# Patient Record
Sex: MALE | URBAN - METROPOLITAN AREA
[De-identification: names, ages, dates, MRNs, and addresses within clinical notes are randomized per-mention and may not be internally consistent; named-entity substitution may affect disease eponyms.]

---

## 2022-10-28 ENCOUNTER — APPOINTMENT (RX ONLY)
Dept: URBAN - METROPOLITAN AREA CLINIC 142 | Facility: CLINIC | Age: 67
Setting detail: DERMATOLOGY
End: 2022-10-28

## 2022-10-28 DIAGNOSIS — Z85.828 PERSONAL HISTORY OF OTHER MALIGNANT NEOPLASM OF SKIN: ICD-10-CM

## 2022-10-28 DIAGNOSIS — M71 OTHER BURSOPATHIES: ICD-10-CM

## 2022-10-28 DIAGNOSIS — B35.1 TINEA UNGUIUM: ICD-10-CM

## 2022-10-28 DIAGNOSIS — L40.0 PSORIASIS VULGARIS: ICD-10-CM

## 2022-10-28 PROBLEM — M71.342 OTHER BURSAL CYST, LEFT HAND: Status: ACTIVE | Noted: 2022-10-28

## 2022-10-28 PROBLEM — L60.9 NAIL DISORDER, UNSPECIFIED: Status: ACTIVE | Noted: 2022-10-28

## 2022-10-28 PROCEDURE — ? NAIL CLIPPING FOR PAS

## 2022-10-28 PROCEDURE — 99213 OFFICE O/P EST LOW 20 MIN: CPT

## 2022-10-28 PROCEDURE — ? COUNSELING

## 2022-10-28 PROCEDURE — ? PRESCRIPTION MEDICATION MANAGEMENT

## 2022-10-28 ASSESSMENT — LOCATION SIMPLE DESCRIPTION DERM
LOCATION SIMPLE: LEFT RING FINGER
LOCATION SIMPLE: RIGHT MIDDLE FINGER
LOCATION SIMPLE: RIGHT RING FINGER
LOCATION SIMPLE: LEFT INDEX FINGER
LOCATION SIMPLE: LEFT MIDDLE FINGER
LOCATION SIMPLE: RIGHT MIDDLE FINGERNAIL
LOCATION SIMPLE: LEFT KNEE
LOCATION SIMPLE: RIGHT KNEE
LOCATION SIMPLE: RIGHT INDEX FINGER

## 2022-10-28 ASSESSMENT — LOCATION DETAILED DESCRIPTION DERM
LOCATION DETAILED: LEFT INDEX FINGERNAIL
LOCATION DETAILED: RIGHT RING FINGERNAIL
LOCATION DETAILED: RIGHT MIDDLE FINGERNAIL
LOCATION DETAILED: RIGHT MIDDLE FINGER LUNULA
LOCATION DETAILED: LEFT MIDDLE FINGERNAIL
LOCATION DETAILED: RIGHT INDEX FINGERNAIL
LOCATION DETAILED: LEFT DISTAL RADIAL PALMAR INDEX FINGER
LOCATION DETAILED: RIGHT KNEE
LOCATION DETAILED: LEFT KNEE
LOCATION DETAILED: LEFT RING FINGERNAIL

## 2022-10-28 ASSESSMENT — LOCATION ZONE DERM
LOCATION ZONE: FINGER
LOCATION ZONE: LEG
LOCATION ZONE: FINGERNAIL

## 2022-10-28 NOTE — PROCEDURE: MIPS QUALITY
Quality 47: Advance Care Plan: Advance Care Planning discussed and documented in the medical record; patient did not wish or was not able to name a surrogate decision maker or provide an advance care plan.
Detail Level: Zone
Quality 130: Documentation Of Current Medications In The Medical Record: Current Medications Documented

## 2022-10-28 NOTE — PROCEDURE: NAIL CLIPPING FOR PAS
Detail Level: Detailed
Billing Type: [employer]
Add [id number] To Bill?: No
Lab: [id number]
Lab Facility: [id number]

## 2023-06-30 ENCOUNTER — APPOINTMENT (RX ONLY)
Dept: URBAN - METROPOLITAN AREA CLINIC 142 | Facility: CLINIC | Age: 68
Setting detail: DERMATOLOGY
End: 2023-06-30

## 2023-06-30 DIAGNOSIS — L91.8 OTHER HYPERTROPHIC DISORDERS OF THE SKIN: ICD-10-CM

## 2023-06-30 DIAGNOSIS — B35.1 TINEA UNGUIUM: ICD-10-CM

## 2023-06-30 PROCEDURE — ? PRESCRIPTION

## 2023-06-30 PROCEDURE — 99213 OFFICE O/P EST LOW 20 MIN: CPT

## 2023-06-30 PROCEDURE — ? ORDER TESTS

## 2023-06-30 PROCEDURE — ? COUNSELING

## 2023-06-30 PROCEDURE — ? ADDITIONAL NOTES

## 2023-06-30 RX ORDER — CICLOPIROX 80 MG/ML
SOLUTION TOPICAL
Qty: 6.6 | Refills: 1 | Status: ERX | COMMUNITY
Start: 2023-06-30

## 2023-06-30 RX ADMIN — CICLOPIROX: 80 SOLUTION TOPICAL at 00:00

## 2023-06-30 ASSESSMENT — LOCATION ZONE DERM: LOCATION ZONE: EYELID

## 2023-06-30 ASSESSMENT — LOCATION SIMPLE DESCRIPTION DERM: LOCATION SIMPLE: LEFT SUPERIOR EYELID

## 2023-06-30 ASSESSMENT — LOCATION DETAILED DESCRIPTION DERM: LOCATION DETAILED: LEFT LATERAL SUPERIOR EYELID

## 2023-06-30 NOTE — PROCEDURE: ADDITIONAL NOTES
Detail Level: Simple
Render Risk Assessment In Note?: no
Additional Notes: Referred to ophthalmologist Dr. Brennen Jamison, info given to pt
Additional Notes: Hep panel lab ordered, pending results for treatment

## 2023-06-30 NOTE — PROCEDURE: ORDER TESTS
Billing Type: [employer]
Expected Date Of Service: 06/30/2023
Lab Facility: [id number]
Performing Laboratory: [id number]
Bill For Surgical Tray: no

## 2023-09-01 ENCOUNTER — APPOINTMENT (RX ONLY)
Dept: URBAN - METROPOLITAN AREA CLINIC 142 | Facility: CLINIC | Age: 68
Setting detail: DERMATOLOGY
End: 2023-09-01

## 2023-09-01 DIAGNOSIS — B35.1 TINEA UNGUIUM: ICD-10-CM

## 2023-09-01 PROCEDURE — ? COUNSELING

## 2023-09-01 PROCEDURE — ? ADDITIONAL NOTES

## 2023-09-01 PROCEDURE — 99213 OFFICE O/P EST LOW 20 MIN: CPT

## 2023-09-01 ASSESSMENT — LOCATION SIMPLE DESCRIPTION DERM
LOCATION SIMPLE: RIGHT MIDDLE FINGERNAIL
LOCATION SIMPLE: LEFT INDEX FINGERNAIL
LOCATION SIMPLE: RIGHT INDEX FINGER

## 2023-09-01 ASSESSMENT — LOCATION DETAILED DESCRIPTION DERM
LOCATION DETAILED: LEFT INDEX FINGER LUNULA
LOCATION DETAILED: RIGHT MIDDLE FINGER LUNULA
LOCATION DETAILED: RIGHT INDEX FINGERNAIL

## 2023-09-01 ASSESSMENT — LOCATION ZONE DERM: LOCATION ZONE: FINGERNAIL

## 2023-09-01 NOTE — PROCEDURE: ADDITIONAL NOTES
Render Risk Assessment In Note?: no
Detail Level: Simple
Additional Notes: Awaiting bloodwork results Hepatic Panel—> once results received the oral medication to be sent in \\[facility] fluconazole 200mg weekly until clear.

## 2023-09-14 ENCOUNTER — RX ONLY (OUTPATIENT)
Age: 68
Setting detail: RX ONLY
End: 2023-09-14

## 2023-09-14 RX ORDER — FLUCONAZOLE 200 MG/1
TABLET ORAL
Qty: 8 | Refills: 0 | Status: ERX | COMMUNITY
Start: 2023-09-14

## 2023-11-17 ENCOUNTER — APPOINTMENT (RX ONLY)
Dept: URBAN - METROPOLITAN AREA CLINIC 142 | Facility: CLINIC | Age: 68
Setting detail: DERMATOLOGY
End: 2023-11-17

## 2023-11-17 ENCOUNTER — RX ONLY (OUTPATIENT)
Age: 68
Setting detail: RX ONLY
End: 2023-11-17

## 2023-11-17 DIAGNOSIS — B35.1 TINEA UNGUIUM: ICD-10-CM

## 2023-11-17 PROCEDURE — ? PRESCRIPTION

## 2023-11-17 PROCEDURE — ? PRESCRIPTION MEDICATION MANAGEMENT

## 2023-11-17 PROCEDURE — 99213 OFFICE O/P EST LOW 20 MIN: CPT

## 2023-11-17 PROCEDURE — ? COUNSELING

## 2023-11-17 PROCEDURE — ? ORDER TESTS

## 2023-11-17 RX ORDER — CICLOPIROX 80 MG/ML
SOLUTION TOPICAL
Qty: 6.6 | Refills: 6 | Status: ERX

## 2023-11-17 RX ORDER — FLUCONAZOLE 200 MG/1
TABLET ORAL
Qty: 8 | Refills: 0 | Status: ERX | COMMUNITY
Start: 2023-11-17

## 2023-11-17 RX ORDER — FLUCONAZOLE 200 MG/1
TABLET ORAL
Qty: 8 | Refills: 6 | Status: CANCELLED
Stop reason: SDUPTHER

## 2023-11-17 RX ADMIN — FLUCONAZOLE: 200 TABLET ORAL at 00:00

## 2023-11-17 ASSESSMENT — LOCATION SIMPLE DESCRIPTION DERM
LOCATION SIMPLE: LEFT INDEX FINGER
LOCATION SIMPLE: LEFT THUMB

## 2023-11-17 ASSESSMENT — LOCATION DETAILED DESCRIPTION DERM
LOCATION DETAILED: LEFT DISTAL ULNAR THUMB
LOCATION DETAILED: LEFT INDEX DISTAL INTERPHALANGEAL JOINT

## 2023-11-17 ASSESSMENT — LOCATION ZONE DERM: LOCATION ZONE: FINGER

## 2023-11-17 NOTE — PROCEDURE: ORDER TESTS
Billing Type: [employer]
Performing Laboratory: [id number]
Bill For Surgical Tray: no
Lab Facility: [id number]
Expected Date Of Service: 11/17/2023

## 2023-11-17 NOTE — PROCEDURE: PRESCRIPTION MEDICATION MANAGEMENT
Continue Regimen: Ciclopirox solution 0.8% qhs
Render In Strict Bullet Format?: No
Plan: Took Fluconazole 200 mg once a week x8 weeks
Detail Level: Zone

## 2024-01-19 ENCOUNTER — APPOINTMENT (RX ONLY)
Dept: URBAN - METROPOLITAN AREA CLINIC 142 | Facility: CLINIC | Age: 69
Setting detail: DERMATOLOGY
End: 2024-01-19

## 2024-01-19 ENCOUNTER — RX ONLY (OUTPATIENT)
Age: 69
Setting detail: RX ONLY
End: 2024-01-19

## 2024-01-19 DIAGNOSIS — B35.1 TINEA UNGUIUM: ICD-10-CM

## 2024-01-19 DIAGNOSIS — L40.0 PSORIASIS VULGARIS: ICD-10-CM | Status: STABLE

## 2024-01-19 PROCEDURE — ? PRESCRIPTION

## 2024-01-19 PROCEDURE — ? COUNSELING

## 2024-01-19 PROCEDURE — ? PRESCRIPTION MEDICATION MANAGEMENT

## 2024-01-19 PROCEDURE — 99214 OFFICE O/P EST MOD 30 MIN: CPT

## 2024-01-19 RX ORDER — CLOBETASOL PROPIONATE 0.5 MG/ML
SOLUTION TOPICAL
Qty: 50 | Refills: 3 | Status: ERX

## 2024-01-19 RX ORDER — FLUCONAZOLE 200 MG/1
TABLET ORAL
Qty: 16 | Refills: 0 | Status: ERX

## 2024-01-19 ASSESSMENT — LOCATION DETAILED DESCRIPTION DERM
LOCATION DETAILED: LEFT SUPERIOR PARIETAL SCALP
LOCATION DETAILED: LEFT DISTAL ULNAR THUMB
LOCATION DETAILED: MID-FRONTAL SCALP

## 2024-01-19 ASSESSMENT — LOCATION ZONE DERM
LOCATION ZONE: FINGER
LOCATION ZONE: SCALP

## 2024-01-19 ASSESSMENT — LOCATION SIMPLE DESCRIPTION DERM
LOCATION SIMPLE: SCALP
LOCATION SIMPLE: LEFT THUMB
LOCATION SIMPLE: ANTERIOR SCALP

## 2024-01-19 NOTE — PROCEDURE: MIPS QUALITY
Detail Level: Zone
Quality 431: Preventive Care And Screening: Unhealthy Alcohol Use - Screening: Patient identified as an unhealthy alcohol user when screened for unhealthy alcohol use using a systematic screening method and received brief counseling

## 2024-01-19 NOTE — PROCEDURE: PRESCRIPTION MEDICATION MANAGEMENT
Continue Regimen: Ciclopirox solution 0.8% qhs
Render In Strict Bullet Format?: No
Plan: Took Fluconazole 200 mg once a week x8 weeks
Detail Level: Zone
Continue Regimen: Clobetasol solution bid x2 weeks as needed

## 2024-04-19 ENCOUNTER — APPOINTMENT (RX ONLY)
Dept: URBAN - METROPOLITAN AREA CLINIC 142 | Facility: CLINIC | Age: 69
Setting detail: DERMATOLOGY
End: 2024-04-19

## 2024-04-19 DIAGNOSIS — L40.0 PSORIASIS VULGARIS: ICD-10-CM | Status: IMPROVED

## 2024-04-19 DIAGNOSIS — B35.1 TINEA UNGUIUM: ICD-10-CM

## 2024-04-19 PROCEDURE — ? ORDER TESTS

## 2024-04-19 PROCEDURE — ? PRESCRIPTION MEDICATION MANAGEMENT

## 2024-04-19 PROCEDURE — ? COUNSELING

## 2024-04-19 PROCEDURE — ? PRESCRIPTION

## 2024-04-19 PROCEDURE — 99214 OFFICE O/P EST MOD 30 MIN: CPT

## 2024-04-19 RX ORDER — CLOBETASOL PROPIONATE 0.5 MG/G
OINTMENT TOPICAL
Qty: 60 | Refills: 0 | Status: ERX | COMMUNITY
Start: 2024-04-19

## 2024-04-19 RX ADMIN — CLOBETASOL PROPIONATE: 0.5 OINTMENT TOPICAL at 00:00

## 2024-04-19 ASSESSMENT — LOCATION SIMPLE DESCRIPTION DERM
LOCATION SIMPLE: ANTERIOR SCALP
LOCATION SIMPLE: LEFT THUMB
LOCATION SIMPLE: SCALP
LOCATION SIMPLE: RIGHT KNEE

## 2024-04-19 ASSESSMENT — LOCATION DETAILED DESCRIPTION DERM
LOCATION DETAILED: RIGHT KNEE
LOCATION DETAILED: LEFT DISTAL ULNAR THUMB
LOCATION DETAILED: MID-FRONTAL SCALP
LOCATION DETAILED: LEFT SUPERIOR PARIETAL SCALP

## 2024-04-19 ASSESSMENT — LOCATION ZONE DERM
LOCATION ZONE: FINGER
LOCATION ZONE: SCALP
LOCATION ZONE: LEG

## 2024-04-19 NOTE — PROCEDURE: PRESCRIPTION MEDICATION MANAGEMENT
Initiate Treatment: Clobetasol ointment bid x2 weeks prn flares to body areas not groin \\nVtama samples to small patches
Continue Regimen: Clobetasol solution bid x2 weeks as needed
Detail Level: Zone
Render In Strict Bullet Format?: No
Continue Regimen: Ciclopirox solution 0.8% qhs\\nFluconazole 200 mg once a  week finishing 16\\nWill repeat LFT if normal will rf for another 16 weeks
Plan: Took Fluconazole 200 mg once a week x8 weeks

## 2024-04-19 NOTE — PROCEDURE: ORDER TESTS
Expected Date Of Service: 04/19/2024
Billing Type: Third-Party Bill
Performing Laboratory: [id number]
Bill For Surgical Tray: no

## 2024-05-10 RX ORDER — FLUCONAZOLE 200 MG/1
TABLET ORAL
Qty: 16 | Refills: 0 | Status: ERX